# Patient Record
Sex: MALE | Race: WHITE
[De-identification: names, ages, dates, MRNs, and addresses within clinical notes are randomized per-mention and may not be internally consistent; named-entity substitution may affect disease eponyms.]

---

## 2021-09-24 ENCOUNTER — HOSPITAL ENCOUNTER (EMERGENCY)
Dept: HOSPITAL 79 - ER1 | Age: 61
Discharge: HOME | End: 2021-09-24
Payer: SELF-PAY

## 2021-09-24 VITALS — HEIGHT: 68 IN | WEIGHT: 285 LBS | BODY MASS INDEX: 43.19 KG/M2

## 2021-09-24 DIAGNOSIS — U07.1: Primary | ICD-10-CM

## 2021-09-24 DIAGNOSIS — Z23: ICD-10-CM

## 2021-09-24 PROCEDURE — M0243 CASIRIVI AND IMDEVI INFUSION: HCPCS

## 2024-11-12 ENCOUNTER — OFFICE VISIT (OUTPATIENT)
Dept: GASTROENTEROLOGY | Facility: CLINIC | Age: 64
End: 2024-11-12
Payer: COMMERCIAL

## 2024-11-12 VITALS
SYSTOLIC BLOOD PRESSURE: 151 MMHG | BODY MASS INDEX: 49.4 KG/M2 | WEIGHT: 307.4 LBS | HEIGHT: 66 IN | HEART RATE: 72 BPM | DIASTOLIC BLOOD PRESSURE: 76 MMHG

## 2024-11-12 DIAGNOSIS — R19.5 POSITIVE COLORECTAL CANCER SCREENING USING COLOGUARD TEST: Primary | ICD-10-CM

## 2024-11-12 DIAGNOSIS — K63.5 POLYP OF COLON, UNSPECIFIED PART OF COLON, UNSPECIFIED TYPE: ICD-10-CM

## 2024-11-12 DIAGNOSIS — K62.89 ANAL OR RECTAL PAIN: ICD-10-CM

## 2024-11-12 RX ORDER — SODIUM, POTASSIUM,MAG SULFATES 17.5-3.13G
1 SOLUTION, RECONSTITUTED, ORAL ORAL EVERY 12 HOURS
Qty: 354 ML | Refills: 1 | Status: SHIPPED | OUTPATIENT
Start: 2024-11-12

## 2024-11-12 NOTE — PROGRESS NOTES
"Date of Consultation:  11/12/2024  Referring Physician: No ref. provider found    Chief Complaint  positive cologuard    Endy Mcclendon is a 64 y.o. male who presents today to [facility] for positive cologuard.    HPI:   64-year-old male who presents today for evaluation of positive Cologuard.  Patient reports that he had a positive Cologuard approximately 2 weeks ago.  He has noticed a hemorrhoid on the left side of his rectum that seems to be more irritated.  This has not bled.  Patient reports a prior colonoscopy >5 years ago at [facility] that was notable for several polyps.  He notes having 2 bowel movements per day that he rates as BSS 4.  He has complete evacuation of his colon without straining.  He denies unintentional weight loss, family history of colon cancer, nausea, vomiting, abdominal pain, melena, and hematochezia.        Previous History:   Past Medical History:   Diagnosis Date    Anxiety       Past Surgical History:   Procedure Laterality Date    HAND SURGERY        Social History     Socioeconomic History    Marital status: Unknown   Tobacco Use    Smoking status: Never    Smokeless tobacco: Never   Substance and Sexual Activity    Alcohol use: Never    Drug use: Never    Sexual activity: Defer        Current Medications:  Current Outpatient Medications   Medication Sig Dispense Refill    sertraline (ZOLOFT) 50 MG tablet       sodium-potassium-magnesium sulfates (Suprep Bowel Prep Kit) 17.5-3.13-1.6 GM/177ML solution oral solution Take 1 bottle by mouth Every 12 (Twelve) Hours. 354 mL 1     No current facility-administered medications for this visit.       Allergies:   Allergies   Allergen Reactions    Penicillins Dizziness       Vitals:   /76   Pulse 72   Ht 167.6 cm (66\")   Wt (!) 139 kg (307 lb 6.4 oz)   BMI 49.62 kg/m²   Estimated body mass index is 49.62 kg/m² as calculated from the following:    Height as of this " "encounter: 167.6 cm (66\").    Weight as of this encounter: 139 kg (307 lb 6.4 oz).    Endy Mcclendon  reports that he has never smoked. He has never used smokeless tobacco. I have educated him on the risk of diseases from using tobacco products such as cancer, COPD, and heart disease.       ROS:   Review of Systems   Constitutional: Negative.    HENT: Negative.     Cardiovascular: Negative.    Gastrointestinal:  Positive for rectal pain. Negative for abdominal distention, abdominal pain, anal bleeding, blood in stool, constipation, diarrhea, nausea and vomiting.   All other systems reviewed and are negative.       Physical Exam:   Physical Exam  Vitals reviewed.   Constitutional:       General: He is not in acute distress.     Appearance: Normal appearance. He is well-groomed. He is obese. He is not toxic-appearing.   HENT:      Head: Normocephalic and atraumatic.      Mouth/Throat:      Mouth: Mucous membranes are moist.   Eyes:      Extraocular Movements: Extraocular movements intact.   Cardiovascular:      Rate and Rhythm: Normal rate and regular rhythm.      Heart sounds: Normal heart sounds. No murmur heard.  Pulmonary:      Effort: Pulmonary effort is normal. No respiratory distress.      Breath sounds: Normal breath sounds. No stridor. No wheezing or rales.   Abdominal:      General: Abdomen is flat. Bowel sounds are normal. There is no distension.      Palpations: Abdomen is soft. There is no mass.      Tenderness: There is no abdominal tenderness. There is no guarding or rebound.      Hernia: No hernia is present.   Skin:     General: Skin is warm.      Coloration: Skin is not jaundiced.      Findings: No rash.   Neurological:      Mental Status: He is alert and oriented to person, place, and time.   Psychiatric:         Mood and Affect: Mood and affect normal.         Speech: Speech normal.         Behavior: Behavior normal. Behavior is cooperative.         Thought Content: Thought content normal.      " "    Lab Results:   No results found for: \"WBC\", \"HGB\", \"HCT\", \"MCV\", \"RDW\", \"PLT\", \"NEUTRORELPCT\", \"LYMPHORELPCT\", \"MONORELPCT\", \"EOSRELPCT\", \"BASORELPCT\", \"NEUTROABS\", \"LYMPHSABS\"    No results found for: \"NA\", \"K\", \"CO2\", \"CL\", \"BUN\", \"CREATININE\", \"EGFRIFNONA\", \"EGFRIFAFRI\", \"GLUCOSE\", \"CALCIUM\", \"ALKPHOS\", \"AST\", \"ALT\", \"BILITOT\", \"ALBUMIN\", \"PROTEINTOT\", \"MG\", \"PHOS\"    Pathology:        Endoscopy:        Imaging:         Results review: During today's encounter, all relevant clinical data has been reviewed.      Assessment and Plan  1. Positive colorectal cancer screening using Cologuard test (Primary)  2. Personal history of colon polyps  3. Rectal Pain   We will proceed with colonoscopy for further evaluation management above.  Patient was educated on risk and benefits of procedure.  He verbalized understanding and was amenable to proceeding as above.  -     Case Request; Standing  -     Case Request  -     sodium-potassium-magnesium sulfates (Suprep Bowel Prep Kit) 17.5-3.13-1.6 GM/177ML solution oral solution; Take 1 bottle by mouth Every 12 (Twelve) Hours.  Dispense: 354 mL; Refill: 1      New Medications:   New Medications Ordered This Visit   Medications    sodium-potassium-magnesium sulfates (Suprep Bowel Prep Kit) 17.5-3.13-1.6 GM/177ML solution oral solution     Sig: Take 1 bottle by mouth Every 12 (Twelve) Hours.     Dispense:  354 mL     Refill:  1       Discontinued Medications:   There are no discontinued medications.     Visit Diagnoses:    ICD-10-CM ICD-9-CM   1. Positive colorectal cancer screening using Cologuard test  R19.5 787.7   2. Polyp of colon, unspecified part of colon, unspecified type  K63.5 211.3   3. Anal or rectal pain  K62.89 569.42            Follow Up:   Return in about 3 months (around 2/12/2025).    The patient was in agreement with the plan and all questions were answered to patient's satisfaction.        This document has been electronically signed by Apple Patel PA-C "   November 12, 2024 15:17 EST    Dictated Utilizing Dragon Dictation: Part of this note may be an electronic transcription/translation of spoken language to printed text using the Dragon Dictation System.    CC:  No ref. provider found  Elisa Vasquez APRN

## 2024-12-05 ENCOUNTER — TELEPHONE (OUTPATIENT)
Dept: GASTROENTEROLOGY | Facility: CLINIC | Age: 64
End: 2024-12-05
Payer: COMMERCIAL

## 2024-12-17 ENCOUNTER — ANESTHESIA EVENT (OUTPATIENT)
Dept: PERIOP | Facility: HOSPITAL | Age: 64
End: 2024-12-17
Payer: COMMERCIAL

## 2024-12-17 ENCOUNTER — APPOINTMENT (OUTPATIENT)
Dept: CT IMAGING | Facility: HOSPITAL | Age: 64
End: 2024-12-17
Payer: COMMERCIAL

## 2024-12-17 ENCOUNTER — HOSPITAL ENCOUNTER (OUTPATIENT)
Facility: HOSPITAL | Age: 64
Setting detail: HOSPITAL OUTPATIENT SURGERY
Discharge: HOME | End: 2024-12-17
Attending: INTERNAL MEDICINE | Admitting: INTERNAL MEDICINE
Payer: COMMERCIAL

## 2024-12-17 ENCOUNTER — ANESTHESIA (OUTPATIENT)
Dept: PERIOP | Facility: HOSPITAL | Age: 64
End: 2024-12-17
Payer: COMMERCIAL

## 2024-12-17 VITALS
WEIGHT: 300 LBS | HEART RATE: 71 BPM | SYSTOLIC BLOOD PRESSURE: 125 MMHG | BODY MASS INDEX: 48.21 KG/M2 | OXYGEN SATURATION: 93 % | HEIGHT: 66 IN | RESPIRATION RATE: 18 BRPM | DIASTOLIC BLOOD PRESSURE: 83 MMHG | TEMPERATURE: 98 F

## 2024-12-17 DIAGNOSIS — D49.0: Primary | ICD-10-CM

## 2024-12-17 DIAGNOSIS — R19.5 POSITIVE COLORECTAL CANCER SCREENING USING COLOGUARD TEST: ICD-10-CM

## 2024-12-17 LAB
ALBUMIN SERPL-MCNC: 3.9 G/DL (ref 3.5–5.2)
ALBUMIN/GLOB SERPL: 1.1 G/DL
ALP SERPL-CCNC: 85 U/L (ref 39–117)
ALT SERPL W P-5'-P-CCNC: 12 U/L (ref 1–41)
ANION GAP SERPL CALCULATED.3IONS-SCNC: 13.5 MMOL/L (ref 5–15)
AST SERPL-CCNC: 13 U/L (ref 1–40)
BASOPHILS # BLD AUTO: 0.04 10*3/MM3 (ref 0–0.2)
BASOPHILS NFR BLD AUTO: 0.6 % (ref 0–1.5)
BILIRUB SERPL-MCNC: 0.8 MG/DL (ref 0–1.2)
BUN SERPL-MCNC: 19 MG/DL (ref 8–23)
BUN/CREAT SERPL: 18.8 (ref 7–25)
CALCIUM SPEC-SCNC: 9 MG/DL (ref 8.6–10.5)
CEA SERPL-MCNC: 0.92 NG/ML
CHLORIDE SERPL-SCNC: 105 MMOL/L (ref 98–107)
CO2 SERPL-SCNC: 20.5 MMOL/L (ref 22–29)
CREAT SERPL-MCNC: 1.01 MG/DL (ref 0.76–1.27)
DEPRECATED RDW RBC AUTO: 46.5 FL (ref 37–54)
EGFRCR SERPLBLD CKD-EPI 2021: 83 ML/MIN/1.73
EOSINOPHIL # BLD AUTO: 0.04 10*3/MM3 (ref 0–0.4)
EOSINOPHIL NFR BLD AUTO: 0.6 % (ref 0.3–6.2)
ERYTHROCYTE [DISTWIDTH] IN BLOOD BY AUTOMATED COUNT: 13.6 % (ref 12.3–15.4)
GLOBULIN UR ELPH-MCNC: 3.5 GM/DL
GLUCOSE SERPL-MCNC: 111 MG/DL (ref 65–99)
HCT VFR BLD AUTO: 48 % (ref 37.5–51)
HGB BLD-MCNC: 15.9 G/DL (ref 13–17.7)
IMM GRANULOCYTES # BLD AUTO: 0.02 10*3/MM3 (ref 0–0.05)
IMM GRANULOCYTES NFR BLD AUTO: 0.3 % (ref 0–0.5)
LYMPHOCYTES # BLD AUTO: 1.6 10*3/MM3 (ref 0.7–3.1)
LYMPHOCYTES NFR BLD AUTO: 24.2 % (ref 19.6–45.3)
MCH RBC QN AUTO: 30.5 PG (ref 26.6–33)
MCHC RBC AUTO-ENTMCNC: 33.1 G/DL (ref 31.5–35.7)
MCV RBC AUTO: 92 FL (ref 79–97)
MONOCYTES # BLD AUTO: 0.63 10*3/MM3 (ref 0.1–0.9)
MONOCYTES NFR BLD AUTO: 9.5 % (ref 5–12)
NEUTROPHILS NFR BLD AUTO: 4.27 10*3/MM3 (ref 1.7–7)
NEUTROPHILS NFR BLD AUTO: 64.8 % (ref 42.7–76)
NRBC BLD AUTO-RTO: 0 /100 WBC (ref 0–0.2)
PLATELET # BLD AUTO: 234 10*3/MM3 (ref 140–450)
PMV BLD AUTO: 9.9 FL (ref 6–12)
POTASSIUM SERPL-SCNC: 4.3 MMOL/L (ref 3.5–5.2)
PROT SERPL-MCNC: 7.4 G/DL (ref 6–8.5)
RBC # BLD AUTO: 5.22 10*6/MM3 (ref 4.14–5.8)
SODIUM SERPL-SCNC: 139 MMOL/L (ref 136–145)
WBC NRBC COR # BLD AUTO: 6.6 10*3/MM3 (ref 3.4–10.8)

## 2024-12-17 PROCEDURE — 71260 CT THORAX DX C+: CPT | Performed by: RADIOLOGY

## 2024-12-17 PROCEDURE — 25510000001 IOPAMIDOL 61 % SOLUTION: Performed by: INTERNAL MEDICINE

## 2024-12-17 PROCEDURE — 45385 COLONOSCOPY W/LESION REMOVAL: CPT | Performed by: INTERNAL MEDICINE

## 2024-12-17 PROCEDURE — 80053 COMPREHEN METABOLIC PANEL: CPT | Performed by: NURSE PRACTITIONER

## 2024-12-17 PROCEDURE — 25010000002 PROPOFOL 200 MG/20ML EMULSION: Performed by: NURSE ANESTHETIST, CERTIFIED REGISTERED

## 2024-12-17 PROCEDURE — 82378 CARCINOEMBRYONIC ANTIGEN: CPT | Performed by: NURSE PRACTITIONER

## 2024-12-17 PROCEDURE — 25010000002 MIDAZOLAM PER 1 MG: Performed by: NURSE ANESTHETIST, CERTIFIED REGISTERED

## 2024-12-17 PROCEDURE — 71260 CT THORAX DX C+: CPT

## 2024-12-17 PROCEDURE — 74177 CT ABD & PELVIS W/CONTRAST: CPT | Performed by: RADIOLOGY

## 2024-12-17 PROCEDURE — 85025 COMPLETE CBC W/AUTO DIFF WBC: CPT | Performed by: NURSE PRACTITIONER

## 2024-12-17 PROCEDURE — 45380 COLONOSCOPY AND BIOPSY: CPT | Performed by: INTERNAL MEDICINE

## 2024-12-17 PROCEDURE — 74177 CT ABD & PELVIS W/CONTRAST: CPT

## 2024-12-17 PROCEDURE — 45381 COLONOSCOPY SUBMUCOUS NJX: CPT | Performed by: INTERNAL MEDICINE

## 2024-12-17 RX ORDER — SODIUM CHLORIDE 9 MG/ML
40 INJECTION, SOLUTION INTRAVENOUS AS NEEDED
Status: DISCONTINUED | OUTPATIENT
Start: 2024-12-17 | End: 2024-12-17 | Stop reason: HOSPADM

## 2024-12-17 RX ORDER — IPRATROPIUM BROMIDE AND ALBUTEROL SULFATE 2.5; .5 MG/3ML; MG/3ML
3 SOLUTION RESPIRATORY (INHALATION) ONCE AS NEEDED
Status: DISCONTINUED | OUTPATIENT
Start: 2024-12-17 | End: 2024-12-17 | Stop reason: HOSPADM

## 2024-12-17 RX ORDER — KETOROLAC TROMETHAMINE 30 MG/ML
30 INJECTION, SOLUTION INTRAMUSCULAR; INTRAVENOUS EVERY 6 HOURS PRN
Status: DISCONTINUED | OUTPATIENT
Start: 2024-12-17 | End: 2024-12-17 | Stop reason: HOSPADM

## 2024-12-17 RX ORDER — PROPOFOL 10 MG/ML
INJECTION, EMULSION INTRAVENOUS AS NEEDED
Status: DISCONTINUED | OUTPATIENT
Start: 2024-12-17 | End: 2024-12-17 | Stop reason: SURG

## 2024-12-17 RX ORDER — MIDAZOLAM HYDROCHLORIDE 1 MG/ML
1 INJECTION, SOLUTION INTRAMUSCULAR; INTRAVENOUS
Status: DISCONTINUED | OUTPATIENT
Start: 2024-12-17 | End: 2024-12-17 | Stop reason: HOSPADM

## 2024-12-17 RX ORDER — MEPERIDINE HYDROCHLORIDE 25 MG/ML
12.5 INJECTION INTRAMUSCULAR; INTRAVENOUS; SUBCUTANEOUS
Status: DISCONTINUED | OUTPATIENT
Start: 2024-12-17 | End: 2024-12-17 | Stop reason: HOSPADM

## 2024-12-17 RX ORDER — ONDANSETRON 2 MG/ML
4 INJECTION INTRAMUSCULAR; INTRAVENOUS AS NEEDED
Status: DISCONTINUED | OUTPATIENT
Start: 2024-12-17 | End: 2024-12-17 | Stop reason: HOSPADM

## 2024-12-17 RX ORDER — SODIUM CHLORIDE 0.9 % (FLUSH) 0.9 %
10 SYRINGE (ML) INJECTION EVERY 12 HOURS SCHEDULED
Status: DISCONTINUED | OUTPATIENT
Start: 2024-12-17 | End: 2024-12-17 | Stop reason: HOSPADM

## 2024-12-17 RX ORDER — FENTANYL CITRATE 50 UG/ML
50 INJECTION, SOLUTION INTRAMUSCULAR; INTRAVENOUS
Status: DISCONTINUED | OUTPATIENT
Start: 2024-12-17 | End: 2024-12-17 | Stop reason: HOSPADM

## 2024-12-17 RX ORDER — MIDAZOLAM HYDROCHLORIDE 1 MG/ML
INJECTION, SOLUTION INTRAMUSCULAR; INTRAVENOUS AS NEEDED
Status: DISCONTINUED | OUTPATIENT
Start: 2024-12-17 | End: 2024-12-17 | Stop reason: SURG

## 2024-12-17 RX ORDER — IOPAMIDOL 612 MG/ML
30 INJECTION, SOLUTION INTRAVASCULAR
Status: COMPLETED | OUTPATIENT
Start: 2024-12-17 | End: 2024-12-17

## 2024-12-17 RX ORDER — OXYCODONE AND ACETAMINOPHEN 5; 325 MG/1; MG/1
1 TABLET ORAL ONCE AS NEEDED
Status: DISCONTINUED | OUTPATIENT
Start: 2024-12-17 | End: 2024-12-17 | Stop reason: HOSPADM

## 2024-12-17 RX ORDER — SODIUM CHLORIDE 0.9 % (FLUSH) 0.9 %
10 SYRINGE (ML) INJECTION AS NEEDED
Status: DISCONTINUED | OUTPATIENT
Start: 2024-12-17 | End: 2024-12-17 | Stop reason: HOSPADM

## 2024-12-17 RX ADMIN — PROPOFOL 50 MG: 10 INJECTION, EMULSION INTRAVENOUS at 11:07

## 2024-12-17 RX ADMIN — PROPOFOL 50 MG: 10 INJECTION, EMULSION INTRAVENOUS at 10:55

## 2024-12-17 RX ADMIN — PROPOFOL 50 MG: 10 INJECTION, EMULSION INTRAVENOUS at 10:39

## 2024-12-17 RX ADMIN — PROPOFOL 50 MG: 10 INJECTION, EMULSION INTRAVENOUS at 10:51

## 2024-12-17 RX ADMIN — PROPOFOL 50 MG: 10 INJECTION, EMULSION INTRAVENOUS at 10:43

## 2024-12-17 RX ADMIN — PROPOFOL 100 MG: 10 INJECTION, EMULSION INTRAVENOUS at 10:27

## 2024-12-17 RX ADMIN — PROPOFOL 50 MG: 10 INJECTION, EMULSION INTRAVENOUS at 11:16

## 2024-12-17 RX ADMIN — PROPOFOL 50 MG: 10 INJECTION, EMULSION INTRAVENOUS at 11:11

## 2024-12-17 RX ADMIN — PROPOFOL 100 MG: 10 INJECTION, EMULSION INTRAVENOUS at 10:31

## 2024-12-17 RX ADMIN — PROPOFOL 50 MG: 10 INJECTION, EMULSION INTRAVENOUS at 10:47

## 2024-12-17 RX ADMIN — PROPOFOL 50 MG: 10 INJECTION, EMULSION INTRAVENOUS at 11:21

## 2024-12-17 RX ADMIN — PROPOFOL 50 MG: 10 INJECTION, EMULSION INTRAVENOUS at 11:02

## 2024-12-17 RX ADMIN — PROPOFOL 100 MG: 10 INJECTION, EMULSION INTRAVENOUS at 10:35

## 2024-12-17 RX ADMIN — MIDAZOLAM HYDROCHLORIDE 2 MG: 1 INJECTION, SOLUTION INTRAMUSCULAR; INTRAVENOUS at 10:24

## 2024-12-17 RX ADMIN — IOPAMIDOL 30 ML: 612 INJECTION, SOLUTION INTRAVENOUS at 12:42

## 2024-12-17 NOTE — ANESTHESIA PREPROCEDURE EVALUATION
Anesthesia Evaluation     Patient summary reviewed and Nursing notes reviewed   no history of anesthetic complications:   NPO Solid Status: > 8 hours  NPO Liquid Status: > 2 hours           Airway   Mallampati: II  TM distance: >3 FB  Neck ROM: full  Dental - normal exam     Pulmonary - negative pulmonary ROS    breath sounds clear to auscultation  Cardiovascular   Exercise tolerance: good (4-7 METS)    Rhythm: regular  Rate: normal        Neuro/Psych  (+) psychiatric history Anxiety  GI/Hepatic/Renal/Endo    (+) obesity, morbid obesity    Musculoskeletal (-) negative ROS    Abdominal   (+) obese    Abdomen: soft.   Substance History - negative use     OB/GYN          Other - negative ROS                     Anesthesia Plan    ASA 3     general     intravenous induction     Anesthetic plan, risks, benefits, and alternatives have been provided, discussed and informed consent has been obtained with: patient.  Pre-procedure education provided  Use of blood products discussed with  Consented to blood products.    Plan discussed with CRNA.    CODE STATUS:

## 2024-12-17 NOTE — PROGRESS NOTES
At the time of your colonoscopy, mass was encountered in the transverse colon.  CT scan of the abdomen and pelvis did reveal soft tissue in the region of the hepatic flexure.  There did not appear to be any enlarged lymph nodes.  There did not appear to be metastatic lesions to the liver.  Your hemoglobin and hematocrit was normal.  Comprehensive metabolic panel was also essentially normal.  You did have a mild elevated glucose level.  I will set you up with  general surgeon for resection of the lesion once pathology is obtained.  You will need repeat colonoscopy in 1 year.

## 2024-12-17 NOTE — ANESTHESIA POSTPROCEDURE EVALUATION
Patient: Endy Mcclendon    Procedure Summary       Date: 12/17/24 Room / Location: [facility] OR  /  [facility] OR    Anesthesia Start: 1024 Anesthesia Stop: 1128    Procedure: COLONOSCOPY Diagnosis:       Positive colorectal cancer screening using Cologuard test      (Positive colorectal cancer screening using Cologuard test [R19.5])    Surgeons: Tracy Will MD Provider: Ortiz Holder MD    Anesthesia Type: general ASA Status: 3            Anesthesia Type: general    Vitals  Vitals Value Taken Time   /86 12/17/24 1205   Temp 98 °F (36.7 °C) 12/17/24 1130   Pulse 60 12/17/24 1207   Resp 18 12/17/24 1203   SpO2 95 % 12/17/24 1207   Vitals shown include unfiled device data.        Post Anesthesia Care and Evaluation    Patient location during evaluation: PACU  Patient participation: complete - patient participated  Level of consciousness: awake  Pain score: 0  Pain management: satisfactory to patient    Airway patency: patent  Anesthetic complications: No anesthetic complications  PONV Status: none  Cardiovascular status: hemodynamically stable  Respiratory status: nasal cannula  Hydration status: acceptable

## 2024-12-17 NOTE — H&P
[facility]    Patient Identification:  Name:  Endy Mcclendon  Age:  64 y.o.  Sex:  male  :  1960  MRN:  [id number]   Visit Number:  [id number]  Primary Care Physician:  Elisa Vasquez APRN       Chief complaint: Positive Cologuard, personal history of colon polyps    History of presenting illness: Mr. Mcclendon is a 64 y.o. male who presents today for colonoscopy.  He reports having previous colonoscopy> 5 years ago in [place], [place] and had several colon polyps removed.  Patient had Cologuard testing with his PCP at the beginning of 2024 which was positive.  He reports his bowels move 1-2 times per day with stool type IV on Esmond stool scale.  He denies having melena or rectal bleeding.  However, he does have external hemorrhoids which can become irritated at times.  He denies having abdominal pain or unusual weight loss.  He is without family history of colon cancer.  He has no other complaints today.    Review of Systems   Constitutional: Negative.    HENT: Negative.     Eyes: Negative.    Respiratory: Negative.     Cardiovascular: Negative.    Gastrointestinal: Negative.    Endocrine: Negative.    Genitourinary: Negative.    Musculoskeletal: Negative.    Allergic/Immunologic: Negative.    Neurological: Negative.    Hematological: Negative.         Past Medical History:   Diagnosis Date    Anxiety     IBS (irritable bowel syndrome)      Past Surgical History:   Procedure Laterality Date    HAND SURGERY       Family History   Problem Relation Age of Onset    Cancer Mother      Social History     Socioeconomic History    Marital status: Unknown   Tobacco Use    Smoking status: Never    Smokeless tobacco: Current     Types: Chew   Vaping Use    Vaping status: Never Used   Substance and Sexual Activity    Alcohol use: Never    Drug use: Never    Sexual activity: Defer       Allergies:  Penicillins    Prior to Admission Medications        Prescriptions Last Dose Informant Patient Reported? Taking?    sertraline (ZOLOFT) 50 MG tablet 12/16/2024  Yes Yes    sodium-potassium-magnesium sulfates (Suprep Bowel Prep Kit) 17.5-3.13-1.6 GM/177ML solution oral solution 12/16/2024  No Yes    Take 1 bottle by mouth Every 12 (Twelve) Hours.          Vital Signs:  Temp:  [98.1 °F (36.7 °C)] 98.1 °F (36.7 °C)  Heart Rate:  [71] 71  Resp:  [18] 18  BP: (147)/(91) 147/91      12/17/24  0929   Weight: 136 kg (300 lb)     Body mass index is 48.42 kg/m².    Physical Exam:  Constitutional:  Alert and oriented. Well developed and well nourished, in no acute distress.  HENT:  Head: Normocephalic and atraumatic.  Mouth:  Moist mucous membranes.  OP clear, mmm  Eyes:  Conjunctivae and EOM are normal.  Pupils are equal, round, and reactive to light.  No scleral icterus.  Neck:  Neck supple.  No JVD present.    Cardiovascular:  RRR, no MRG.  Pulmonary/Chest:  CTAB, unlabored.   Abdominal:  Soft.  Bowel sounds are normal.  No distension and no tenderness.   Musculoskeletal:  No edema, no tenderness, and no deformity.   Neurological:  MS as above, grossly nonfocal exam     Assessment and Plan:  Proceed with colonoscopy given personal history of colon polyps and for evaluation of recent, positive cologuard.     Sosa Cardenas, RAFFI  12/17/24  09:39 EST

## 2024-12-18 LAB — REF LAB TEST METHOD: NORMAL

## 2024-12-18 NOTE — PROGRESS NOTES
A CEA level was drawn at the time of your colonoscopy.  This is a tumor marker.  This marker was within normal range.  I have referred you to  for further management.  You will need repeat colonoscopy in 1 year.

## 2025-01-02 ENCOUNTER — OFFICE VISIT (OUTPATIENT)
Dept: SURGERY | Facility: CLINIC | Age: 65
End: 2025-01-02
Payer: COMMERCIAL

## 2025-01-02 VITALS
WEIGHT: 307 LBS | DIASTOLIC BLOOD PRESSURE: 83 MMHG | HEIGHT: 66 IN | BODY MASS INDEX: 49.34 KG/M2 | SYSTOLIC BLOOD PRESSURE: 142 MMHG

## 2025-01-02 DIAGNOSIS — D12.2 ADENOMATOUS POLYP OF ASCENDING COLON: ICD-10-CM

## 2025-01-02 DIAGNOSIS — R19.5 POSITIVE COLORECTAL CANCER SCREENING USING COLOGUARD TEST: Primary | ICD-10-CM

## 2025-01-02 PROCEDURE — 99204 OFFICE O/P NEW MOD 45 MIN: CPT | Performed by: SURGERY

## 2025-01-02 NOTE — LETTER
January 2, 2025     Tracy Will MD  [address] [facility]  [address]  [place] [address]    Patient: Endy Mcclendon   YOB: 1960   Date of Visit: 1/2/2025     Dear Tracy Will MD:       Thank you for referring Endy Mcclendon to me for evaluation. Below are the relevant portions of my assessment and plan of care.    If you have questions, please do not hesitate to call me. I look forward to following Endy along with you.         Sincerely,        Lewis Pedroza MD        CC: No Recipients    Lewis Pedroza MD  01/02/25 4610  Sign when Signing Visit  Date of Service: 1/2/2025    Subjective  Endy Mcclendon is a 64 y.o. male is being seen for consultation for hepatic flexure polyp today at the request of Tracy Yarbrough    Chief complaint: Hepatic flexure polyp    Endy Mcclendon is a 64 y.o. class III obese male who presents my clinic after recent colonoscopy noted multiple polyps including a large hepatic flexure polyp that was partially resected.  This was a tubulovillous adenoma without dysplasia.  He was tattooed by gastroenterology.  The patient's prior colonoscopy was 5 to 10 years prior.  No family history of colon cancer    The patient presents today to discuss options    Past Medical History:   Diagnosis Date   • Anxiety    • IBS (irritable bowel syndrome)        Past Surgical History:   Procedure Laterality Date   • COLONOSCOPY N/A 12/17/2024    Procedure: COLONOSCOPY;  Surgeon: Tracy Will MD;  Location: [facility];  Service: Gastroenterology;  Laterality: N/A;   • HAND SURGERY           Family History   Problem Relation Age of Onset   • Cancer Mother          Social History     Socioeconomic History   • Marital status: Unknown   Tobacco Use   • Smoking status: Never     Passive exposure: Never   • Smokeless tobacco: Current     Types: Chew   Vaping Use   • Vaping status: Never Used   Substance and Sexual Activity   • Alcohol use: Never   • Drug use: Never   •  "Sexual activity: Defer                Review of Systems     14 pt ROS negative except for what is noted in HPI        Objective    Physical Exam:      01/02/25  1457   Weight: (!) 139 kg (307 lb)    Body mass index is 49.58 kg/m².  Constitution: /83   Ht 167.6 cm (65.98\")   Wt (!) 139 kg (307 lb)   BMI 49.58 kg/m²  . No acute distress.  Morbidly obese  Head: Normocephalic, atraumatic.   Eyes: Aligned without strabismus. Conjunctivae noninjected   Ears, Nose, Mouth: , no lesions appreciated   Respiratory: Symmetric chest expansion. No respiratory distress.   Gastrointestinal:  Soft, nondistended, nontender  Skin:  No cyanosis, clubbing or edema bilaterally    Lymphatics: No abnormal cervical or supraclavicular adenopathy  Neurologic: No gross deficits.  Alert and oriented x3  Psychiatric: Normal mood        Results:    I personally reviewed the patient's CT abdomen and pelvis which demonstrates the lesion at the hepatic flexure      Endy Mcclendon is a 64 y.o. class III obese male with large hepatic flexure polyp, tubulovillous adenoma without dysplasia    We discussed the patient's pathology and potential options.  We discussed that the lesion was partially resected but there could be more worrisome tissue within the remaining lesion.  This ranges from multiple repeated colonoscopies in attempt to endoscopically resect the lesion.  In contrast, we discussed robotic assisted laparoscopic right hemicolectomy, possible open.  The patient has some concerns regarding missing work  Patient and wife are gone discussed these options.  I drew a diagram for them with the potential pros and cons of each option.  They will notify us of their decision    Patient does not regularly see a physician.  We may need to get cardiac clearance prior to surgery    Class 3 Severe Obesity (BMI >=40). Obesity-related health conditions include the following: none. Obesity is unchanged. BMI is is above average; no BMI management plan " is appropriate. We discussed portion control, increasing exercise, and Information on healthy weight added to patient's after visit summary.              This document has been electronically signed by Lewis Pedroza MD   January 2, 2025 17:34 [facility]

## 2025-01-02 NOTE — PROGRESS NOTES
"Date of Service: 1/2/2025    Subjective   Endy Mcclendon is a 64 y.o. male is being seen for consultation for hepatic flexure polyp today at the request of Tracy Yarbrough    Chief complaint: Hepatic flexure polyp    Endy Mcclendon is a 64 y.o. class III obese male who presents my clinic after recent colonoscopy noted multiple polyps including a large hepatic flexure polyp that was partially resected.  This was a tubulovillous adenoma without dysplasia.  He was tattooed by gastroenterology.  The patient's prior colonoscopy was 5 to 10 years prior.  No family history of colon cancer    The patient presents today to discuss options    Past Medical History:   Diagnosis Date    Anxiety     IBS (irritable bowel syndrome)        Past Surgical History:   Procedure Laterality Date    COLONOSCOPY N/A 12/17/2024    Procedure: COLONOSCOPY;  Surgeon: Tracy Will MD;  Location: [facility];  Service: Gastroenterology;  Laterality: N/A;    HAND SURGERY           Family History   Problem Relation Age of Onset    Cancer Mother          Social History     Socioeconomic History    Marital status: Unknown   Tobacco Use    Smoking status: Never     Passive exposure: Never    Smokeless tobacco: Current     Types: Chew   Vaping Use    Vaping status: Never Used   Substance and Sexual Activity    Alcohol use: Never    Drug use: Never    Sexual activity: Defer                Review of Systems     14 pt ROS negative except for what is noted in HPI        Objective     Physical Exam:      01/02/25  1457   Weight: (!) 139 kg (307 lb)    Body mass index is 49.58 kg/m².  Constitution: /83   Ht 167.6 cm (65.98\")   Wt (!) 139 kg (307 lb)   BMI 49.58 kg/m²  . No acute distress.  Morbidly obese  Head: Normocephalic, atraumatic.   Eyes: Aligned without strabismus. Conjunctivae noninjected   Ears, Nose, Mouth: , no lesions appreciated   Respiratory: Symmetric chest expansion. No respiratory distress.   Gastrointestinal:  Soft, " nondistended, nontender  Skin:  No cyanosis, clubbing or edema bilaterally    Lymphatics: No abnormal cervical or supraclavicular adenopathy  Neurologic: No gross deficits.  Alert and oriented x3  Psychiatric: Normal mood        Results:    I personally reviewed the patient's CT abdomen and pelvis which demonstrates the lesion at the hepatic flexure      Endy Mcclendon is a 64 y.o. class III obese male with large hepatic flexure polyp, tubulovillous adenoma without dysplasia    We discussed the patient's pathology and potential options.  We discussed that the lesion was partially resected but there could be more worrisome tissue within the remaining lesion.  This ranges from multiple repeated colonoscopies in attempt to endoscopically resect the lesion.  In contrast, we discussed robotic assisted laparoscopic right hemicolectomy, possible open.  The patient has some concerns regarding missing work  Patient and wife are gone discussed these options.  I drew a diagram for them with the potential pros and cons of each option.  They will notify us of their decision    Patient does not regularly see a physician.  We may need to get cardiac clearance prior to surgery    Class 3 Severe Obesity (BMI >=40). Obesity-related health conditions include the following: none. Obesity is unchanged. BMI is is above average; no BMI management plan is appropriate. We discussed portion control, increasing exercise, and Information on healthy weight added to patient's after visit summary.              This document has been electronically signed by Lewis Pedroza MD   January 2, 2025 17:34 [facility]

## 2025-01-08 ENCOUNTER — TELEPHONE (OUTPATIENT)
Dept: GASTROENTEROLOGY | Facility: CLINIC | Age: 65
End: 2025-01-08
Payer: COMMERCIAL

## 2025-01-08 NOTE — TELEPHONE ENCOUNTER
I called the patient to discuss options in respect to the large polyp to be removed from the transverse colon/hepatic flexure region.  He did not answer his cell phone.  I called his wife Kina.  She stated that he was not keen on undergoing surgical intervention for the removal of the large polyp.  I recommended that he undergo endoscopic mucosal resection.  I would like to send him to Dr. Kalpesh Lee for consultation and endoscopic mucosal resection of this large polyp in the transverse colon/hepatic flexure region.  This was a tubular/tubulovillous adenoma.  However, this could be a more advanced lesion as this was only a sample.  The patient's wife states that he is in an area of [place] ([place] with poor phone reception.  I will try to call him again tonight to discuss his options.    1/9/2025 addendum: I talked to the patient today about sending him to Dr. Kalpesh Lee for endoscopic mucosal resection of the large polyp found in the transverse colon/hepatic flexure region.  He is agreeable to see Dr. Lee.  I will put in a referral for him.

## 2025-01-09 ENCOUNTER — TELEPHONE (OUTPATIENT)
Dept: GASTROENTEROLOGY | Facility: CLINIC | Age: 65
End: 2025-01-09

## 2025-01-09 DIAGNOSIS — K63.89 COLONIC MASS: Primary | ICD-10-CM

## 2025-01-09 NOTE — TELEPHONE ENCOUNTER
I called the patient.  He is agreeable to see Dr. Kalpesh Lee for endoscopic mucosal resection of a very large polyp in the colon.  Please arrange for the patient to see Dr. Lee.

## 2025-01-09 NOTE — TELEPHONE ENCOUNTER
Caller: Kina Mcclendon    Relationship to patient: Emergency Contact    Best call back number: [phone]      Patient is needing: PT STATED SHE SPOKE WITH MARY ABOUT HER SPOUSE SEEING A DR IN [place]. THEY ARE WILLING TO DO SO, PLEASE CALL PT TO DISCUSS.

## (undated) DEVICE — ENDOGATOR TUBING FOR ENDOGATOR EGP-100 IRRIGATION PUMP,OLYMPUS OFP PUMP, OLYMPUS AFU-100 PUMP AND ERBE EIP2 PUMP: Brand: ENDOGATOR

## (undated) DEVICE — FRCP BX RADJAW4 NDL 2.8 240CM LG OG BX40

## (undated) DEVICE — CONN Y IRR DISP 1P/U

## (undated) DEVICE — Device: Brand: SPOT EX ENDOSCOPIC TATTOO

## (undated) DEVICE — Device

## (undated) DEVICE — ENDOGATOR AUXILIARY WATER JET CONNECTOR: Brand: ENDOGATOR

## (undated) DEVICE — SNAR POLYP CAPTIFLX MICRO OVL 13MM 240CM

## (undated) DEVICE — SNAR POLYP CAPTIFLX WD OVL 27MM 240CM

## (undated) DEVICE — POLYP TRAP: Brand: TRAPEASE®

## (undated) DEVICE — Device: Brand: DEFENDO AIR/WATER/SUCTION AND BIOPSY VALVE

## (undated) DEVICE — ERBE NESSY®PLATE 170 SPLIT; 168CM²; CABLE 3M: Brand: ERBE

## (undated) DEVICE — NDL SCLEROTHRPY INTERJECT 25G 4 240 CLR